# Patient Record
Sex: FEMALE | Race: BLACK OR AFRICAN AMERICAN | NOT HISPANIC OR LATINO | Employment: FULL TIME | URBAN - METROPOLITAN AREA
[De-identification: names, ages, dates, MRNs, and addresses within clinical notes are randomized per-mention and may not be internally consistent; named-entity substitution may affect disease eponyms.]

---

## 2018-04-16 ENCOUNTER — TRANSCRIBE ORDERS (OUTPATIENT)
Dept: ADMINISTRATIVE | Facility: HOSPITAL | Age: 28
End: 2018-04-16

## 2018-04-16 DIAGNOSIS — D50.8 OTHER IRON DEFICIENCY ANEMIA: ICD-10-CM

## 2018-04-16 DIAGNOSIS — N92.1 METRORRHAGIA: Primary | ICD-10-CM

## 2018-04-20 ENCOUNTER — HOSPITAL ENCOUNTER (OUTPATIENT)
Dept: ULTRASOUND IMAGING | Facility: HOSPITAL | Age: 28
Discharge: HOME | End: 2018-04-20
Admitting: FAMILY MEDICINE

## 2018-04-20 DIAGNOSIS — N92.1 METRORRHAGIA: ICD-10-CM

## 2018-04-20 DIAGNOSIS — D50.8 OTHER IRON DEFICIENCY ANEMIA: ICD-10-CM

## 2018-04-20 PROCEDURE — 76830 TRANSVAGINAL US NON-OB: CPT

## 2018-04-20 PROCEDURE — 76856 US EXAM PELVIC COMPLETE: CPT

## 2018-05-16 ENCOUNTER — OFFICE VISIT (OUTPATIENT)
Dept: OBSTETRICS AND GYNECOLOGY | Facility: CLINIC | Age: 28
End: 2018-05-16

## 2018-05-16 VITALS
HEIGHT: 64 IN | BODY MASS INDEX: 50.02 KG/M2 | SYSTOLIC BLOOD PRESSURE: 120 MMHG | WEIGHT: 293 LBS | DIASTOLIC BLOOD PRESSURE: 90 MMHG

## 2018-05-16 DIAGNOSIS — N93.8 DUB (DYSFUNCTIONAL UTERINE BLEEDING): Primary | ICD-10-CM

## 2018-05-16 DIAGNOSIS — E66.01 MORBID OBESITY WITH BMI OF 50.0-59.9, ADULT (HCC): ICD-10-CM

## 2018-05-16 DIAGNOSIS — Q51.28 SEPTATE UTERUS: ICD-10-CM

## 2018-05-16 PROCEDURE — 99202 OFFICE O/P NEW SF 15 MIN: CPT | Performed by: OBSTETRICS & GYNECOLOGY

## 2018-05-16 RX ORDER — MEDROXYPROGESTERONE ACETATE 10 MG/1
10 TABLET ORAL DAILY
Qty: 10 TABLET | Refills: 0 | Status: SHIPPED | OUTPATIENT
Start: 2018-05-16 | End: 2018-05-26

## 2018-05-16 RX ORDER — BENAZEPRIL HYDROCHLORIDE 10 MG/1
10 TABLET ORAL DAILY
COMMUNITY

## 2018-05-16 NOTE — PROGRESS NOTES
Subjective   Staci Banuelos is a 27 y.o. female with abnormal uterine bleeding     History of Present Illness     27-year-old nulliparous black female presents due to very heavy, prolonged irregular periods.  She had menarche at age 10 and has had bleeding episodes lasting up to a month at a time.  Her PCP did basic blood testing and was told insulin and testosterone levels were essentially normal.  She has chronic hypertension as well as morbid obesity.  She has not used any contraceptives and has failed to conceive.  An ultrasound was performed which revealed a septate uterus.  She is possibly interested in a future pregnancy.    The following portions of the patient's history were reviewed and updated as appropriate: allergies, current medications, past family history, past medical history, past social history, past surgical history and problem list.    Review of Systems   Genitourinary: Positive for menstrual problem, pelvic pain and vaginal bleeding.       Objective   Physical Exam   The patient is alert and conversant and in no acute distress.  She is morbidly obese weighing 317 pounds with a BMI of 54  Her abdomen is obese, soft and nontender.  No masses were palpable.  The vulva and perineum are without lesion.  The vagina has a small amount of blood in the vault.  The cervix is grossly normal in appearance without lesion or tenderness to motion.  Uterus is anteverted and not palpably enlarged.  The adnexa are not palpable and without tenderness.  The rectovaginal septum is intact.    Assessment/Plan   Staci was seen today for gynecologic exam.    Diagnoses and all orders for this visit:    DUB (dysfunctional uterine bleeding)  -     medroxyPROGESTERone (PROVERA) 10 MG tablet; Take 1 tablet by mouth Daily for 10 days.    Morbid obesity with BMI of 50.0-59.9, adult    Septate uterus     We discussed the findings of a septate uterus and I explained this in detail.  We also discussed the process of ovulation and  conception.  I recommended we try cyclic Provera to try and regulate her menstrual cycles.  We discussed some infertility treatments used with patients with a septate uterus.  She will call with results.

## 2018-07-27 ENCOUNTER — OFFICE VISIT (OUTPATIENT)
Dept: OBSTETRICS AND GYNECOLOGY | Facility: CLINIC | Age: 28
End: 2018-07-27

## 2018-07-27 VITALS
DIASTOLIC BLOOD PRESSURE: 76 MMHG | HEIGHT: 64 IN | BODY MASS INDEX: 50.02 KG/M2 | WEIGHT: 293 LBS | SYSTOLIC BLOOD PRESSURE: 120 MMHG

## 2018-07-27 DIAGNOSIS — Q51.28 SEPTATE UTERUS: ICD-10-CM

## 2018-07-27 DIAGNOSIS — E66.01 MORBID OBESITY WITH BMI OF 50.0-59.9, ADULT (HCC): Primary | ICD-10-CM

## 2018-07-27 DIAGNOSIS — N91.2 AMENORRHEA: ICD-10-CM

## 2018-07-27 PROCEDURE — 99213 OFFICE O/P EST LOW 20 MIN: CPT | Performed by: OBSTETRICS & GYNECOLOGY

## 2018-07-27 NOTE — PROGRESS NOTES
Subjective   Staci Banuelos is a 27 y.o. female for follow-up     History of Present Illness    27-year-old nulliparous black female presents for follow-up.  She was seen in May with dysfunctional uterine bleeding and was found to have a septate uterus.  She was given a course of Provera and had withdrawal bleeding.  She has not had any bleeding since.  She has been monitoring for ovulation and not found any evidence of ovulation occurring.  She feels well.  She denies symptoms of pregnancy.    The following portions of the patient's history were reviewed and updated as appropriate: allergies, current medications, past family history, past medical history, past social history, past surgical history and problem list.    Review of Systems   Gastrointestinal: Negative for abdominal distention and abdominal pain.   Genitourinary: Positive for menstrual problem. Negative for pelvic pain, vaginal bleeding and vaginal discharge.       Objective   Physical Exam   She is alert and conversant and in no acute distress.  The abdomen is soft, obese and nontender.  No masses were palpable.  The vulva and perineum are without lesion.  The vagina is without bleeding or discharge.  The cervix is nulliparous and nontender to motion.  The uterus is anteverted and small and mobile.  The adnexa are without mass or tenderness.  The rectovaginal septum is without induration or tenderness.  Extremities her without swelling or tenderness.  Assessment/Plan   Staci was seen today for gynecologic exam.    Diagnoses and all orders for this visit:    Morbid obesity with BMI of 50.0-59.9, adult (CMS/[facility])    Septate uterus    Amenorrhea  -     HCG, B-subunit, Quantitative  -     FSH & LH     We discussed the findings and options of evaluation and treatment.  I recommended we check her ovarian function as well as an hCG to rule out pregnancy.  She will call for results.

## 2018-07-28 LAB
FSH SERPL-ACNC: 2.7 MIU/ML
HCG INTACT+B SERPL-ACNC: <0.5 MIU/ML
LH SERPL-ACNC: 2.2 MIU/ML

## 2018-08-03 ENCOUNTER — TELEPHONE (OUTPATIENT)
Dept: OBSTETRICS AND GYNECOLOGY | Facility: CLINIC | Age: 28
End: 2018-08-03

## 2018-08-06 ENCOUNTER — TELEPHONE (OUTPATIENT)
Dept: OBSTETRICS AND GYNECOLOGY | Facility: CLINIC | Age: 28
End: 2018-08-06

## 2018-08-06 DIAGNOSIS — Q51.28 SEPTATE UTERUS: Primary | ICD-10-CM

## 2018-08-06 NOTE — TELEPHONE ENCOUNTER
We discussed her lab results and negative pregnancy test.  I also discussed her uterine septum in detail.  I recommended she see an infertility specialist to address the septum prior to attempting ovulation induction and recommended she see Dr. Gordo Gray for further evaluation and possible therapy.

## 2020-03-19 ENCOUNTER — LAB REQUISITION (OUTPATIENT)
Dept: LAB | Facility: OTHER | Age: 30
End: 2020-03-19

## 2020-03-19 DIAGNOSIS — Z11.1 TUBERCULOSIS SCREENING: ICD-10-CM
